# Patient Record
Sex: FEMALE | URBAN - METROPOLITAN AREA
[De-identification: names, ages, dates, MRNs, and addresses within clinical notes are randomized per-mention and may not be internally consistent; named-entity substitution may affect disease eponyms.]

---

## 2022-08-31 ENCOUNTER — APPOINTMENT (OUTPATIENT)
Dept: URBAN - METROPOLITAN AREA CLINIC 197 | Age: 57
Setting detail: DERMATOLOGY
End: 2022-09-01

## 2022-08-31 DIAGNOSIS — L30.8 OTHER SPECIFIED DERMATITIS: ICD-10-CM

## 2022-08-31 PROCEDURE — OTHER MIPS QUALITY: OTHER

## 2022-08-31 PROCEDURE — OTHER PRESCRIPTION MEDICATION MANAGEMENT: OTHER

## 2022-08-31 PROCEDURE — 99212 OFFICE O/P EST SF 10 MIN: CPT

## 2022-08-31 PROCEDURE — OTHER PRESCRIPTION: OTHER

## 2022-08-31 PROCEDURE — OTHER COUNSELING: OTHER

## 2022-08-31 RX ORDER — CLONAZEPAM 0.5 MG/1
TABLET ORAL
Qty: 30 | Refills: 0 | Status: ERX | COMMUNITY
Start: 2022-08-31

## 2022-08-31 ASSESSMENT — LOCATION ZONE DERM: LOCATION ZONE: SCALP

## 2022-08-31 ASSESSMENT — LOCATION DETAILED DESCRIPTION DERM
LOCATION DETAILED: LEFT INFERIOR OCCIPITAL SCALP
LOCATION DETAILED: LEFT SUPERIOR PARIETAL SCALP

## 2022-08-31 ASSESSMENT — LOCATION SIMPLE DESCRIPTION DERM
LOCATION SIMPLE: POSTERIOR SCALP
LOCATION SIMPLE: SCALP

## 2022-08-31 NOTE — HPI: RASH
What Type Of Note Output Would You Prefer (Optional)?: Bullet Format
How Severe Is Your Rash?: mild
Is This A New Presentation, Or A Follow-Up?: Rash
Additional History: Relates to sunburn one year ago.  Bumps flare on and off, can’t stop picking at it.  History of DLE and rosacea.  No itch.  High stress relating to her son and his  training.

## 2022-08-31 NOTE — PROCEDURE: PRESCRIPTION MEDICATION MANAGEMENT
Detail Level: Zone
Initiate Treatment: Klonopin 1/2 po bid.  RD drowsiness.  May apply Vaseline to scalp.
Render In Strict Bullet Format?: No